# Patient Record
Sex: FEMALE | Race: WHITE | NOT HISPANIC OR LATINO | URBAN - METROPOLITAN AREA
[De-identification: names, ages, dates, MRNs, and addresses within clinical notes are randomized per-mention and may not be internally consistent; named-entity substitution may affect disease eponyms.]

---

## 2017-02-22 ENCOUNTER — OUTPATIENT (OUTPATIENT)
Dept: OUTPATIENT SERVICES | Facility: HOSPITAL | Age: 67
LOS: 1 days | Discharge: HOME | End: 2017-02-22

## 2017-02-22 DIAGNOSIS — R10.30 LOWER ABDOMINAL PAIN, UNSPECIFIED: ICD-10-CM

## 2017-02-22 DIAGNOSIS — Z12.11 ENCOUNTER FOR SCREENING FOR MALIGNANT NEOPLASM OF COLON: ICD-10-CM

## 2017-02-22 LAB
ANION GAP SERPL CALC-SCNC: 7 MMOL/L — SIGNIFICANT CHANGE UP (ref 5–17)
BASOPHILS # BLD AUTO: 0.1 K/UL — SIGNIFICANT CHANGE UP (ref 0–0.2)
BASOPHILS NFR BLD AUTO: 1.3 % — SIGNIFICANT CHANGE UP (ref 0–2)
BUN SERPL-MCNC: 8 MG/DL — SIGNIFICANT CHANGE UP (ref 7–23)
CALCIUM SERPL-MCNC: 8.9 MG/DL — SIGNIFICANT CHANGE UP (ref 8.5–10.1)
CHLORIDE SERPL-SCNC: 106 MMOL/L — SIGNIFICANT CHANGE UP (ref 96–108)
CO2 SERPL-SCNC: 30 MMOL/L — SIGNIFICANT CHANGE UP (ref 22–31)
CREAT SERPL-MCNC: 0.66 MG/DL — SIGNIFICANT CHANGE UP (ref 0.5–1.3)
EOSINOPHIL # BLD AUTO: 0.1 K/UL — SIGNIFICANT CHANGE UP (ref 0–0.5)
EOSINOPHIL NFR BLD AUTO: 2.6 % — SIGNIFICANT CHANGE UP (ref 0–6)
GLUCOSE SERPL-MCNC: 100 MG/DL — HIGH (ref 70–99)
HCT VFR BLD CALC: 42.8 % — SIGNIFICANT CHANGE UP (ref 34.5–45)
HGB BLD-MCNC: 14.2 G/DL — SIGNIFICANT CHANGE UP (ref 11.5–15.5)
LYMPHOCYTES # BLD AUTO: 1.1 K/UL — SIGNIFICANT CHANGE UP (ref 1–3.3)
LYMPHOCYTES # BLD AUTO: 29.5 % — SIGNIFICANT CHANGE UP (ref 13–44)
MCHC RBC-ENTMCNC: 29.7 PG — SIGNIFICANT CHANGE UP (ref 27–34)
MCHC RBC-ENTMCNC: 33.3 GM/DL — SIGNIFICANT CHANGE UP (ref 32–36)
MCV RBC AUTO: 89 FL — SIGNIFICANT CHANGE UP (ref 80–100)
MONOCYTES # BLD AUTO: 0.3 K/UL — SIGNIFICANT CHANGE UP (ref 0–0.9)
MONOCYTES NFR BLD AUTO: 7 % — SIGNIFICANT CHANGE UP (ref 2–14)
NEUTROPHILS # BLD AUTO: 2.3 K/UL — SIGNIFICANT CHANGE UP (ref 1.8–7.4)
NEUTROPHILS NFR BLD AUTO: 59.6 % — SIGNIFICANT CHANGE UP (ref 43–77)
PLATELET # BLD AUTO: 330 K/UL — SIGNIFICANT CHANGE UP (ref 150–400)
POTASSIUM SERPL-MCNC: 3.8 MMOL/L — SIGNIFICANT CHANGE UP (ref 3.5–5.3)
POTASSIUM SERPL-SCNC: 3.8 MMOL/L — SIGNIFICANT CHANGE UP (ref 3.5–5.3)
RBC # BLD: 4.81 M/UL — SIGNIFICANT CHANGE UP (ref 3.8–5.2)
RBC # FLD: 13 % — SIGNIFICANT CHANGE UP (ref 10.3–14.5)
SODIUM SERPL-SCNC: 143 MMOL/L — SIGNIFICANT CHANGE UP (ref 135–145)
WBC # BLD: 3.9 K/UL — SIGNIFICANT CHANGE UP (ref 3.8–10.5)
WBC # FLD AUTO: 3.9 K/UL — SIGNIFICANT CHANGE UP (ref 3.8–10.5)

## 2017-02-28 ENCOUNTER — RESULT REVIEW (OUTPATIENT)
Age: 67
End: 2017-02-28

## 2017-03-01 ENCOUNTER — OUTPATIENT (OUTPATIENT)
Dept: OUTPATIENT SERVICES | Facility: HOSPITAL | Age: 67
LOS: 1 days | Discharge: HOME | End: 2017-03-01
Payer: COMMERCIAL

## 2017-03-01 VITALS
WEIGHT: 187.83 LBS | HEART RATE: 99 BPM | OXYGEN SATURATION: 98 % | RESPIRATION RATE: 18 BRPM | SYSTOLIC BLOOD PRESSURE: 139 MMHG | HEIGHT: 65 IN | DIASTOLIC BLOOD PRESSURE: 82 MMHG | TEMPERATURE: 97 F

## 2017-03-01 DIAGNOSIS — Z98.890 OTHER SPECIFIED POSTPROCEDURAL STATES: Chronic | ICD-10-CM

## 2017-03-01 DIAGNOSIS — N83.209 UNSPECIFIED OVARIAN CYST, UNSPECIFIED SIDE: Chronic | ICD-10-CM

## 2017-03-01 LAB
C DIFF BY PCR RESULT: DETECTED
C DIFF TOX GENS STL QL NAA+PROBE: SIGNIFICANT CHANGE UP

## 2017-03-01 PROCEDURE — 88305 TISSUE EXAM BY PATHOLOGIST: CPT | Mod: 26

## 2017-03-01 RX ORDER — SODIUM CHLORIDE 9 MG/ML
1000 INJECTION INTRAMUSCULAR; INTRAVENOUS; SUBCUTANEOUS
Qty: 0 | Refills: 0 | Status: DISCONTINUED | OUTPATIENT
Start: 2017-03-01 | End: 2017-03-16

## 2017-03-01 NOTE — ASU PATIENT PROFILE, ADULT - PMH
Hyperlipemia    Hypertension    Migraine    PNA (pneumonia)    RAD (reactive airway disease)    Vertigo

## 2017-03-03 LAB — SURGICAL PATHOLOGY FINAL REPORT - CH: SIGNIFICANT CHANGE UP

## 2017-03-08 DIAGNOSIS — I34.1 NONRHEUMATIC MITRAL (VALVE) PROLAPSE: ICD-10-CM

## 2017-03-08 DIAGNOSIS — G47.33 OBSTRUCTIVE SLEEP APNEA (ADULT) (PEDIATRIC): ICD-10-CM

## 2017-03-08 DIAGNOSIS — J45.909 UNSPECIFIED ASTHMA, UNCOMPLICATED: ICD-10-CM

## 2017-03-08 DIAGNOSIS — K57.30 DIVERTICULOSIS OF LARGE INTESTINE WITHOUT PERFORATION OR ABSCESS WITHOUT BLEEDING: ICD-10-CM

## 2017-03-08 DIAGNOSIS — E78.5 HYPERLIPIDEMIA, UNSPECIFIED: ICD-10-CM

## 2017-03-08 DIAGNOSIS — G43.909 MIGRAINE, UNSPECIFIED, NOT INTRACTABLE, WITHOUT STATUS MIGRAINOSUS: ICD-10-CM

## 2017-03-08 DIAGNOSIS — K52.9 NONINFECTIVE GASTROENTERITIS AND COLITIS, UNSPECIFIED: ICD-10-CM

## 2017-03-08 DIAGNOSIS — F32.9 MAJOR DEPRESSIVE DISORDER, SINGLE EPISODE, UNSPECIFIED: ICD-10-CM

## 2017-03-08 DIAGNOSIS — I10 ESSENTIAL (PRIMARY) HYPERTENSION: ICD-10-CM

## 2017-03-08 DIAGNOSIS — K64.8 OTHER HEMORRHOIDS: ICD-10-CM

## 2017-03-08 DIAGNOSIS — R10.32 LEFT LOWER QUADRANT PAIN: ICD-10-CM

## 2017-03-08 DIAGNOSIS — K21.9 GASTRO-ESOPHAGEAL REFLUX DISEASE WITHOUT ESOPHAGITIS: ICD-10-CM

## 2018-05-16 NOTE — ASU PATIENT PROFILE, ADULT - PMH
Carotid stenosis    Chronic sinusitis    Depression    Hyperlipemia    Hypertension    IBS (irritable bowel syndrome)    Migraine    PNA (pneumonia)    RAD (reactive airway disease)    Sciatica  b/l  Tendonitis  elbow, wrist  Vertigo

## 2018-05-17 ENCOUNTER — OUTPATIENT (OUTPATIENT)
Dept: OUTPATIENT SERVICES | Facility: HOSPITAL | Age: 68
LOS: 1 days | Discharge: HOME | End: 2018-05-17
Payer: MEDICARE

## 2018-05-17 ENCOUNTER — RESULT REVIEW (OUTPATIENT)
Age: 68
End: 2018-05-17

## 2018-05-17 VITALS
WEIGHT: 188.94 LBS | TEMPERATURE: 97 F | DIASTOLIC BLOOD PRESSURE: 66 MMHG | OXYGEN SATURATION: 98 % | HEART RATE: 84 BPM | RESPIRATION RATE: 14 BRPM | SYSTOLIC BLOOD PRESSURE: 117 MMHG | HEIGHT: 65 IN

## 2018-05-17 DIAGNOSIS — Z98.890 OTHER SPECIFIED POSTPROCEDURAL STATES: Chronic | ICD-10-CM

## 2018-05-17 DIAGNOSIS — N83.209 UNSPECIFIED OVARIAN CYST, UNSPECIFIED SIDE: Chronic | ICD-10-CM

## 2018-05-17 PROCEDURE — 88305 TISSUE EXAM BY PATHOLOGIST: CPT | Mod: 26

## 2018-05-17 RX ORDER — ROSUVASTATIN CALCIUM 5 MG/1
1 TABLET ORAL
Qty: 0 | Refills: 0 | COMMUNITY

## 2018-05-17 RX ORDER — ELETRIPTAN HYDROBROMIDE 20 MG/1
1 TABLET, FILM COATED ORAL
Qty: 0 | Refills: 0 | COMMUNITY

## 2018-05-17 RX ORDER — AZELASTINE 137 UG/1
2 SPRAY, METERED NASAL
Qty: 0 | Refills: 0 | COMMUNITY

## 2018-05-17 RX ORDER — CHOLECALCIFEROL (VITAMIN D3) 125 MCG
1 CAPSULE ORAL
Qty: 0 | Refills: 0 | COMMUNITY

## 2018-05-17 RX ORDER — EZETIMIBE 10 MG/1
1 TABLET ORAL
Qty: 0 | Refills: 0 | COMMUNITY

## 2018-05-21 LAB — SURGICAL PATHOLOGY FINAL REPORT - CH: SIGNIFICANT CHANGE UP

## 2018-05-23 DIAGNOSIS — F32.9 MAJOR DEPRESSIVE DISORDER, SINGLE EPISODE, UNSPECIFIED: ICD-10-CM

## 2018-05-23 DIAGNOSIS — K52.9 NONINFECTIVE GASTROENTERITIS AND COLITIS, UNSPECIFIED: ICD-10-CM

## 2018-05-23 DIAGNOSIS — E78.00 PURE HYPERCHOLESTEROLEMIA, UNSPECIFIED: ICD-10-CM

## 2018-05-23 DIAGNOSIS — I10 ESSENTIAL (PRIMARY) HYPERTENSION: ICD-10-CM

## 2018-05-23 DIAGNOSIS — K57.30 DIVERTICULOSIS OF LARGE INTESTINE WITHOUT PERFORATION OR ABSCESS WITHOUT BLEEDING: ICD-10-CM

## 2018-05-23 DIAGNOSIS — I34.1 NONRHEUMATIC MITRAL (VALVE) PROLAPSE: ICD-10-CM

## 2018-05-23 DIAGNOSIS — E78.5 HYPERLIPIDEMIA, UNSPECIFIED: ICD-10-CM

## 2018-05-23 DIAGNOSIS — K62.89 OTHER SPECIFIED DISEASES OF ANUS AND RECTUM: ICD-10-CM

## 2018-07-31 ENCOUNTER — APPOINTMENT (OUTPATIENT)
Dept: COLORECTAL SURGERY | Facility: CLINIC | Age: 68
End: 2018-07-31
Payer: MEDICARE

## 2018-07-31 VITALS
SYSTOLIC BLOOD PRESSURE: 120 MMHG | WEIGHT: 178 LBS | DIASTOLIC BLOOD PRESSURE: 82 MMHG | RESPIRATION RATE: 14 BRPM | HEIGHT: 65 IN | BODY MASS INDEX: 29.66 KG/M2 | TEMPERATURE: 98.2 F

## 2018-07-31 DIAGNOSIS — K64.5 PERIANAL VENOUS THROMBOSIS: ICD-10-CM

## 2018-07-31 PROBLEM — J18.9 PNEUMONIA, UNSPECIFIED ORGANISM: Chronic | Status: ACTIVE | Noted: 2017-03-01

## 2018-07-31 PROBLEM — M54.30 SCIATICA, UNSPECIFIED SIDE: Chronic | Status: ACTIVE | Noted: 2018-05-10

## 2018-07-31 PROBLEM — J45.909 UNSPECIFIED ASTHMA, UNCOMPLICATED: Chronic | Status: ACTIVE | Noted: 2017-03-01

## 2018-07-31 PROBLEM — I10 ESSENTIAL (PRIMARY) HYPERTENSION: Chronic | Status: ACTIVE | Noted: 2017-03-01

## 2018-07-31 PROBLEM — J32.9 CHRONIC SINUSITIS, UNSPECIFIED: Chronic | Status: ACTIVE | Noted: 2018-05-10

## 2018-07-31 PROBLEM — M77.9 ENTHESOPATHY, UNSPECIFIED: Chronic | Status: ACTIVE | Noted: 2018-05-10

## 2018-07-31 PROBLEM — R42 DIZZINESS AND GIDDINESS: Chronic | Status: ACTIVE | Noted: 2017-03-01

## 2018-07-31 PROBLEM — K58.9 IRRITABLE BOWEL SYNDROME WITHOUT DIARRHEA: Chronic | Status: ACTIVE | Noted: 2018-05-10

## 2018-07-31 PROBLEM — G43.909 MIGRAINE, UNSPECIFIED, NOT INTRACTABLE, WITHOUT STATUS MIGRAINOSUS: Chronic | Status: ACTIVE | Noted: 2017-03-01

## 2018-07-31 PROBLEM — I65.29 OCCLUSION AND STENOSIS OF UNSPECIFIED CAROTID ARTERY: Chronic | Status: ACTIVE | Noted: 2018-05-10

## 2018-07-31 PROBLEM — E78.5 HYPERLIPIDEMIA, UNSPECIFIED: Chronic | Status: ACTIVE | Noted: 2017-03-01

## 2018-07-31 PROBLEM — F32.9 MAJOR DEPRESSIVE DISORDER, SINGLE EPISODE, UNSPECIFIED: Chronic | Status: ACTIVE | Noted: 2018-05-10

## 2018-07-31 PROCEDURE — 99243 OFF/OP CNSLTJ NEW/EST LOW 30: CPT | Mod: 25

## 2018-07-31 PROCEDURE — 46320 REMOVAL OF HEMORRHOID CLOT: CPT

## 2018-07-31 PROCEDURE — 99203 OFFICE O/P NEW LOW 30 MIN: CPT | Mod: 25

## 2021-04-18 ENCOUNTER — APPOINTMENT (OUTPATIENT)
Dept: DISASTER EMERGENCY | Facility: CLINIC | Age: 71
End: 2021-04-18

## 2021-04-18 DIAGNOSIS — Z01.818 ENCOUNTER FOR OTHER PREPROCEDURAL EXAMINATION: ICD-10-CM

## 2021-04-18 LAB — SARS-COV-2 N GENE NPH QL NAA+PROBE: NOT DETECTED

## 2021-12-27 ENCOUNTER — TRANSCRIPTION ENCOUNTER (OUTPATIENT)
Age: 71
End: 2021-12-27

## 2022-06-21 ENCOUNTER — APPOINTMENT (OUTPATIENT)
Dept: PAIN MANAGEMENT | Facility: CLINIC | Age: 72
End: 2022-06-21

## 2022-07-18 ENCOUNTER — APPOINTMENT (OUTPATIENT)
Dept: PAIN MANAGEMENT | Facility: CLINIC | Age: 72
End: 2022-07-18

## 2022-07-18 PROCEDURE — 27096 INJECT SACROILIAC JOINT: CPT | Mod: LT

## 2022-07-18 NOTE — PROCEDURE
[FreeTextEntry3] : Date of Service: 07/18/2022 \par \par Account: [id number]\par \par Patient: TORI IRBY \par \par YOB: 1950\par \par Age: 72 year\par \par \par Surgeon:  Naila Middleton M.D.\par \par Pre-Operative Diagnosis: \par \par Post Operative Diagnosis: \par \par Procedure: Left Sacroiliac joint injection under fluoroscopic guidance\par \par                     Left Sacroiliac joint arthrogram  \par \par Anesthesia:  MAC\par \par \par This procedure was carried out using fluoroscopic guidance.  The risks and benefits of the procedure were discussed extensively with the patient.  The consent of the patient was obtained and the following procedure was performed.\par \par The patient was placed in the prone position.  The patient's back was prepped and draped in a sterile fashion.  The fluoroscopic image intensifier was then positioned so that anterior and posterior portion of the sacroiliac joint lined up in the same plane and appeared on the image as clear space.  This was achieved with slight cephalad and right medial angulation.  The area corresponding to the left inferior SIJ space was then identified and marked. \par \par The skin and subcutaneous structures were then anesthetized using 1 cc of 1% lidocaine.  A 22 gauge spinal needle was then inserted and directed into the right sacroiliac joint space using fluoroscopic guidance.  After negative aspiration for heme and CSF, 2 cc of Omnipaque was injected and appeared to fill the joint space.  An injectate of 3cc 0.25% marcaine plus 80 mg of Kenalog was then injected into the left sacroiliac joint space.\par \par The needles were then removed and pressure was applied.  Anesthesia personnel were present throughout the procedure.\par \par The patient was instructed to apply ice over the injection site for twenty minutes every two hours for the next 24 to 48 hours.  The patient was also instructed to contact me immediately if there were any problems. \par \par Naila Middleton M.D.\par

## 2022-08-01 ENCOUNTER — TRANSCRIPTION ENCOUNTER (OUTPATIENT)
Age: 72
End: 2022-08-01

## 2022-08-08 ENCOUNTER — APPOINTMENT (OUTPATIENT)
Dept: PAIN MANAGEMENT | Facility: CLINIC | Age: 72
End: 2022-08-08

## 2022-08-08 VITALS — HEIGHT: 65 IN | BODY MASS INDEX: 29.99 KG/M2 | WEIGHT: 180 LBS

## 2022-08-08 DIAGNOSIS — M46.1 SACROILIITIS, NOT ELSEWHERE CLASSIFIED: ICD-10-CM

## 2022-08-08 PROCEDURE — 99214 OFFICE O/P EST MOD 30 MIN: CPT

## 2022-08-08 NOTE — ASSESSMENT
[FreeTextEntry1] : After discussing various treatment options with the patient including but not limited to oral medications, physical therapy, exercise, modalities as well as interventional spinal injections, we have decided with the following plan:\par I personally reviewed the MRI/CT scan images and agree with the radiologist's report. The radiological findings were discussed with the patient.\par The risks, benefits, contents and alternatives to injection were explained in full to the patient. Risks outlined include but are not limited to infection,sepsis, bleeding, post-dural puncture headache, nerve damage, temporary increase in pain, syncopal episode, failure to resolve symptoms, allergic reaction, symptom recurrence, and elevation of blood sugar in diabetics. Cortisone may cause immunosuppression. Patient understands the risks. All questions were answered. After discussion of options, patient requested an injection. Information regarding the injection was given to the patient. Which medications to stop prior to the injection was explained to the patient as well.\par Follow up in 1-2 weeks post injection for re-evaluation. \par Continue Home exercises, stretching, activity modification, physical therapy, and conservative care.\par Patient is presenting with acute/sub-acute radicular pain with impairment in ADLs and functionality. The pain has not responded to conservative care including nsaid therapy and/or physical therapy. There is no bleeding tendency, unstable medical condition, or systemic infection.\par \par Patient is presenting with acute/sub-acute radicular pain with impairment in ADLs and functionality.  The pain has not responded to  conservative care including nsaid therapy and/or physical therapy.  There is no bleeding tendency, unstable medical condition, or systemic infection.\par \par LESI L5-S1 Will call

## 2022-08-08 NOTE — HISTORY OF PRESENT ILLNESS
[Mid-back] : mid-back [Lower back] : lower back [4] : 4 [1] : 2 [Dull/Aching] : dull/aching [] : yes [Intermittent] : intermittent [Meds] : meds [Sitting] : sitting [Standing] : standing [Walking] : walking [FreeTextEntry1] : 08/08/2022: follow up today after left SIJ injection on 7/18/22. \par \par 1/24/22: follow up today after left L4/5 L5/S1 TFESI on 1/4/22. Pain in the left lower back with radiation into the left\par buttock. pain over the left SIJ. \par \par 12/6/21: follow up today. Pain is returning will schedule repeat injection.\par \par 7/28/21- patient had 60% relief from pain in leg. Still has some upper thigh pain. Will repeat injection. ESR was normal. very tender over the left piriformis muscle. \par \par 6/23/21: follow up today to review MRI. This was essentially unchanged from previous. Pain 5/10 today. Pain is\par currently in the left lower back with radiation to the left thigh. do not have the results of the blood work. \par \par MRI 6/15/211: At L5-S1: There is disc bulge and facet arthropathy. There is bilateral subarticular recess stenosis with impingement of descending S1 nerve roots. There is left greater than right neuroforaminal stenosis with impingement of exiting left L5 nerve root. Findings are unchanged from previous study.\par At L4-5: There is disc bulge and facet arthropathy. There is mild to moderate spinal canal stenosis. There is left greater than right subarticular recess stenosis with suspected impingement descending L5 nerve roots. There is moderate left and mild right neuroforaminal stenosis. Findings are not significant changed from previous examination.\par At L3-4: There is disc bulge and right foraminal disc protrusion. There is mild to moderate spinal canal stenosis. There is moderate right and mild left neuroforaminal stenosis mild impingement of exiting nerve root on the right.\par Findings are not significant changed from previous examination.\par At L2-3: There is disc bulge with mild spinal canal bilateral neural foraminal stenosis. Findings are not significant changed from previous examination.\par At L1-2: There is disc bulge and shallow right central disc protrusion without significant spinal canal stenosis. There is mild right subarticular recess stenosis and mild right neuroforaminal stenosis unchanged from previous\par examination.\par \par 6/14/21- Patient here for f/u. patient states about 1 month ago she was laying flat in bed and could not lift left leg.\par She has to lift her leg with her hands. Also complains of pain is in front of thigh to the knee. Has slight numbness. Will get new MRI as it is over 3 years old.\par \par 2/23/21- Patient complains of b/l low back pain worse on left. Would like to call for an injection as she will be getting her second covid vaccine next week [FreeTextEntry7] : Left buttock

## 2022-11-08 RX ORDER — LIDOCAINE 5 G/100G
5 OINTMENT TOPICAL
Qty: 1 | Refills: 2 | Status: ACTIVE | COMMUNITY
Start: 2022-08-08 | End: 1900-01-01

## 2022-11-30 ENCOUNTER — RX RENEWAL (OUTPATIENT)
Age: 72
End: 2022-11-30

## 2022-11-30 RX ORDER — DICLOFENAC SODIUM 75 MG/1
75 TABLET, DELAYED RELEASE ORAL
Qty: 30 | Refills: 3 | Status: ACTIVE | COMMUNITY
Start: 2022-08-08 | End: 1900-01-01

## 2022-12-02 RX ORDER — LIDOCAINE 5% 700 MG/1
5 PATCH TOPICAL
Qty: 90 | Refills: 2 | Status: ACTIVE | COMMUNITY
Start: 2022-12-02 | End: 1900-01-01

## 2023-06-15 ENCOUNTER — APPOINTMENT (OUTPATIENT)
Dept: PAIN MANAGEMENT | Facility: CLINIC | Age: 73
End: 2023-06-15
Payer: MEDICARE

## 2023-06-15 VITALS — HEIGHT: 65 IN | WEIGHT: 182 LBS | BODY MASS INDEX: 30.32 KG/M2

## 2023-06-15 PROCEDURE — 99214 OFFICE O/P EST MOD 30 MIN: CPT

## 2023-06-15 NOTE — HISTORY OF PRESENT ILLNESS
[Mid-back] : mid-back [Lower back] : lower back [4] : 4 [1] : 2 [Dull/Aching] : dull/aching [] : yes [Intermittent] : intermittent [Meds] : meds [Sitting] : sitting [Standing] : standing [Walking] : walking [Buttock] : buttock [Left Leg] : left leg [Radiating] : radiating [Leisure] : leisure [Tightness] : tightness [Sleep] : sleep [Social interactions] : social interactions [Bending forward] : bending forward [Lying in bed] : lying in bed [FreeTextEntry1] : 06/15/2023: follow up today. Pain is across the lower back to the hips and intermittent to the left posterior thigh. Last month she had an intense pain that limited her walking. Pain took about 2 days to dissipate. \par \par 08/08/2022: follow up today after left SIJ injection on 7/18/22. \par \par 1/24/22: follow up today after left L4/5 L5/S1 TFESI on 1/4/22. Pain in the left lower back with radiation into the left\par buttock. pain over the left SIJ. \par \par 12/6/21: follow up today. Pain is returning will schedule repeat injection.\par \par 7/28/21- patient had 60% relief from pain in leg. Still has some upper thigh pain. Will repeat injection. ESR was normal. very tender over the left piriformis muscle. \par \par 6/23/21: follow up today to review MRI. This was essentially unchanged from previous. Pain 5/10 today. Pain is\par currently in the left lower back with radiation to the left thigh. do not have the results of the blood work. \par \par MRI 6/15/21: At L5-S1: There is disc bulge and facet arthropathy. There is bilateral subarticular recess stenosis with impingement of descending S1 nerve roots. There is left greater than right neuroforaminal stenosis with impingement of exiting left L5 nerve root. Findings are unchanged from previous study.\par At L4-5: There is disc bulge and facet arthropathy. There is mild to moderate spinal canal stenosis. There is left greater than right subarticular recess stenosis with suspected impingement descending L5 nerve roots. There is moderate left and mild right neuroforaminal stenosis. Findings are not significant changed from previous examination.\par At L3-4: There is disc bulge and right foraminal disc protrusion. There is mild to moderate spinal canal stenosis. There is moderate right and mild left neuroforaminal stenosis mild impingement of exiting nerve root on the right.\par Findings are not significant changed from previous examination.\par At L2-3: There is disc bulge with mild spinal canal bilateral neural foraminal stenosis. Findings are not significant changed from previous examination.\par At L1-2: There is disc bulge and shallow right central disc protrusion without significant spinal canal stenosis. There is mild right subarticular recess stenosis and mild right neuroforaminal stenosis unchanged from previous\par examination.\par \par 6/14/21- Patient here for f/u. patient states about 1 month ago she was laying flat in bed and could not lift left leg.\par She has to lift her leg with her hands. Also complains of pain is in front of thigh to the knee. Has slight numbness. Will get new MRI as it is over 3 years old.\par \par 2/23/21- Patient complains of b/l low back pain worse on left. Would like to call for an injection as she will be getting her second covid vaccine next week [FreeTextEntry6] : weakness [FreeTextEntry7] : Left buttock

## 2023-06-15 NOTE — PHYSICAL EXAM
[] : SI joint tenderness [TWNoteComboBox7] : forward flexion 60 degrees [de-identified] : extension 20 degrees

## 2023-06-15 NOTE — ASSESSMENT
[FreeTextEntry1] : After discussing various treatment options with the patient including but not limited to oral medications, physical therapy, exercise, modalities as well as interventional spinal injections, we have decided with the following plan:\par \par 1) Intervention Injection Therapy:\par I personally reviewed the MRI/CT scan images and agree with the radiologist's report. The radiological findings were discussed with the patient.\par The risks, benefits, contents and alternatives to injection were explained in full to the patient. Risks outlined include but are not limited to infection,sepsis, bleeding, post-dural puncture headache, nerve damage, temporary increase in pain, syncopal episode, failure to resolve symptoms, allergic reaction, symptom recurrence, and elevation of blood sugar in diabetics. Cortisone may cause immunosuppression. Patient understands the risks. All questions were answered. After discussion of options, patient requested an injection. Information regarding the injection was given to the patient. Which medications to stop prior to the injection was explained to the patient as well.\par \par Follow up in 1-2 weeks post injection for re-evaluation. \par Continue Home exercises, stretching, activity modification, physical therapy, and conservative care.\par \par Patient is presenting with acute/sub-acute radicular pain with impairment in ADLs and functionality.  The pain has not responded sufficiently to  conservative care including nsaid therapy and/or physical therapy.  There is no bleeding tendency, unstable medical condition, or systemic infection. The purpose of the spinal injections is to facilitate active therapy by providing short term relief through reduction of pain and inflammation. \par \par Injections, by themselves, are not likely to provide long-term relief. Rather, active rehabilitation with modified work achieves long-term relief by increasing active ROM, strength and stability. \par \par left L5/s1 and S1 TFESI

## 2023-06-27 ENCOUNTER — APPOINTMENT (OUTPATIENT)
Dept: PAIN MANAGEMENT | Facility: CLINIC | Age: 73
End: 2023-06-27
Payer: MEDICARE

## 2023-06-27 PROCEDURE — 64484 NJX AA&/STRD TFRM EPI L/S EA: CPT | Mod: LT

## 2023-06-27 PROCEDURE — 64483 NJX AA&/STRD TFRM EPI L/S 1: CPT | Mod: LT

## 2023-06-27 NOTE — PROCEDURE
[FreeTextEntry3] : Date of Service: 06/27/2023 \par \par Account: [id number]\par \par Patient: TORI IRBY \par \par YOB: 1950\par \par Age: 73 year\par \par \par Surgeon: Naila Middleton M.D.\par \par Assistant: None.\par \par Pre-Operative Diagnosis: Lumbosacral Radiculitis (M54.17)\par \par Post Operative Diagnosis: Lumbosacral Radiculitis (M54.17)\par \par Procedure: Left L5-S1, S1 transforaminal epidural steroid injection under fluoroscopic guidance.  \par \par Anesthesia:      MAC\par \par \par This procedure was carried out using fluoroscopic guidance.  The risks and benefits of the procedure were discussed extensively with the patient.  The consent of the patient was obtained and the following procedure was performed. The patient was placed in the prone position on the fluoroscopic table and the lumbar area was prepped and draped in a sterile fashion.\par \par The left L5-S1 neural foramen was then identified on left oblique  "liliya dog" anatomical view at the 6 o' clock position using fluoroscopic guidance, and the area was marked. The overlying skin and subcutaneous structures were anesthetized using sterile technique with 1% Lidocaine.  A 22 gauge spinal needle was directed toward the inferior (6 o'clock) position of the pedicle, which formed the roof of the identified foramen.  Once in the epidural space, after negative aspiration for heme and CSF, 1cc of Omnipaque contrast was injected to confirm epidural location and assess filling defects and rule out intravascular needle placement. \par \par The following contrast flow and epidurogram was observed: no intravascular or intrathecal flow pattern was noted.  No blood or CSF was aspirated. Omnipaque spread appeared to outline the left L5 nerve root and spread medially into the epidural space.  \par \par After this, an injectate of 3 cc preservative free normal saline plus 40 mg of Kenalog was injected in the epidural space.\par \par The  left S1 neural foramen was then identified on left oblique anatomical view at the 6 o' clock position of the S1 pedicle using fluoroscopic guidance, and the area was marked. The overlying skin and subcutaneous structures were anesthetized using sterile technique with 1% Lidocaine.  A 22 gauge spinal needle was directed toward the inferior (6 o'clock) position of the pedicle, which formed the roof of the identified foramen.  Once in the epidural space, after negative aspiration for heme and CSF, 1cc of Omnipaque contrast was injected to confirm epidural location and assess filling defects and rule out intravascular needle placement. \par \par The following contrast flow and epidurogram was observed: no intravascular or intrathecal flow pattern was noted.  No blood or CSF was aspirated. Omnipaque spread appeared to outline the left S1 nerve root and spread medially into the epidural space.  \par \par After this, an injectate of 3 cc preservative free normal saline plus 40 mg of Kenalog was injected in the epidural space.\par \par The needle was subsequently removed.  Vital signs remained normal.  Pulse oximeter was used throughout the procedure and the patient's pulse and oxygen saturation remained within normal limits.  The patient tolerated the procedure well.  There were no complications.  The patient was instructed to apply ice over the injection sites for twenty minutes every two hours for the next 24 to 48 hours.  The patient was also instructed to contact me immediately if there were any problems.\par \par Naila Middleton M.D.

## 2023-07-27 ENCOUNTER — APPOINTMENT (OUTPATIENT)
Dept: PAIN MANAGEMENT | Facility: CLINIC | Age: 73
End: 2023-07-27
Payer: MEDICARE

## 2023-07-27 VITALS — BODY MASS INDEX: 30.99 KG/M2 | WEIGHT: 186 LBS | HEIGHT: 65 IN

## 2023-07-27 PROCEDURE — 99214 OFFICE O/P EST MOD 30 MIN: CPT

## 2023-07-27 NOTE — PHYSICAL EXAM
[] : no ecchymosis [TWNoteComboBox7] : forward flexion 60 degrees [de-identified] : extension 20 degrees

## 2023-07-27 NOTE — HISTORY OF PRESENT ILLNESS
[Mid-back] : mid-back [Lower back] : lower back [Buttock] : buttock [Left Leg] : left leg [Right Leg] : right leg [5] : 5 [1] : 2 [Dull/Aching] : dull/aching [Radiating] : radiating [Tightness] : tightness [] : yes [Intermittent] : intermittent [Leisure] : leisure [Sleep] : sleep [Social interactions] : social interactions [Meds] : meds [Sitting] : sitting [Standing] : standing [Walking] : walking [Bending forward] : bending forward [Lying in bed] : lying in bed [FreeTextEntry1] : 07/27/2023: follow up today for Left L5-S1, S1 TFESI on 6/27,  Had 50% relief.  Pain still in left buttocks\par \par 06/15/2023: follow up today. Pain is across the lower back to the hips and intermittent to the left posterior thigh. Last month she had an intense pain that limited her walking. Pain took about 2 days to dissipate. \par \par 08/08/2022: follow up today after left SIJ injection on 7/18/22. \par \par 1/24/22: follow up today after left L4/5 L5/S1 TFESI on 1/4/22. Pain in the left lower back with radiation into the left\par buttock. pain over the left SIJ. \par \par 12/6/21: follow up today. Pain is returning will schedule repeat injection.\par \par 7/28/21- patient had 60% relief from pain in leg. Still has some upper thigh pain. Will repeat injection. ESR was normal. very tender over the left piriformis muscle. \par \par 6/23/21: follow up today to review MRI. This was essentially unchanged from previous. Pain 5/10 today. Pain is\par currently in the left lower back with radiation to the left thigh. do not have the results of the blood work. \par \par MRI 6/15/21: At L5-S1: There is disc bulge and facet arthropathy. There is bilateral subarticular recess stenosis with impingement of descending S1 nerve roots. There is left greater than right neuroforaminal stenosis with impingement of exiting left L5 nerve root. Findings are unchanged from previous study.\par At L4-5: There is disc bulge and facet arthropathy. There is mild to moderate spinal canal stenosis. There is left greater than right subarticular recess stenosis with suspected impingement descending L5 nerve roots. There is moderate left and mild right neuroforaminal stenosis. Findings are not significant changed from previous examination.\par At L3-4: There is disc bulge and right foraminal disc protrusion. There is mild to moderate spinal canal stenosis. There is moderate right and mild left neuroforaminal stenosis mild impingement of exiting nerve root on the right.\par Findings are not significant changed from previous examination.\par At L2-3: There is disc bulge with mild spinal canal bilateral neural foraminal stenosis. Findings are not significant changed from previous examination.\par At L1-2: There is disc bulge and shallow right central disc protrusion without significant spinal canal stenosis. There is mild right subarticular recess stenosis and mild right neuroforaminal stenosis unchanged from previous\par examination.\par \par 6/14/21- Patient here for f/u. patient states about 1 month ago she was laying flat in bed and could not lift left leg.\par She has to lift her leg with her hands. Also complains of pain is in front of thigh to the knee. Has slight numbness. Will get new MRI as it is over 3 years old.\par \par 2/23/21- Patient complains of b/l low back pain worse on left. Would like to call for an injection as she will be getting her second covid vaccine next week [FreeTextEntry6] : weakness [FreeTextEntry7] : Left buttock

## 2023-07-27 NOTE — PHYSICAL EXAM
[] : no ecchymosis [TWNoteComboBox7] : forward flexion 60 degrees [de-identified] : extension 20 degrees

## 2023-08-30 ENCOUNTER — APPOINTMENT (OUTPATIENT)
Dept: PAIN MANAGEMENT | Facility: CLINIC | Age: 73
End: 2023-08-30
Payer: MEDICARE

## 2023-08-30 PROCEDURE — J3490M: CUSTOM | Mod: NC

## 2023-08-30 PROCEDURE — 64445 NJX AA&/STRD SCIATIC NRV IMG: CPT | Mod: LT

## 2023-08-30 NOTE — PROCEDURE
[FreeTextEntry3] : Date of Service: 08/30/2023   Account: [id number]  Patient: TORI IRBY   YOB: 1950  Age: 73 year   LEFT SCIATIC NERVE BLOCK/PIRIFROMIS INJECTION  Pre-op diagnosis:   Post Operative Diagnosis:    Procedure:  Left Sciatic nerve Block/Piriformis injection under flouroscopic guidance.  Anesthesia: MAC   This procedure was carried out using fluoroscopic guidance.  The risks and benefits of the procedure were discussed extensively with the patient.  The consent of the patient was obtained and the following procedure was performed.  The patient was placed prone on the table.  Fluoroscopy was utilized to delineate the anatomy of the sacrum and the right greater trochanter.  Surface and deep landmarks were identified as well.  After skin prep, drape, and local infiltration of 1.5% lidocaine, a 22 gauge spinal needle was advanced approximately half way between the sacrum and the greater trochanter.  The needle was passed to a depth of maximal tenderness or until oss was contacted.  1 cc of Omnipaque was injected and lateral spread delineating the piriformis muscle was oberserved.  No vascular spread was noted.  40mg of Kenalog with 3 cc of 0.25% marcaine was then injected.  The patient tolerated the procedure well.  Vital signs remained normal throughout the procedure.  The patient was instructed to apply ice over the injection site for twenty minutes every two hours for the next 24 to 48 hours.  The patient was also instructed to contact me immediately if there were any problems  Naila Middleton M.D.

## 2023-09-28 ENCOUNTER — APPOINTMENT (OUTPATIENT)
Dept: PAIN MANAGEMENT | Facility: CLINIC | Age: 73
End: 2023-09-28
Payer: MEDICARE

## 2023-09-28 VITALS — HEIGHT: 65 IN | BODY MASS INDEX: 29.49 KG/M2 | WEIGHT: 177 LBS

## 2023-09-28 DIAGNOSIS — M54.16 RADICULOPATHY, LUMBAR REGION: ICD-10-CM

## 2023-09-28 PROCEDURE — 20552 NJX 1/MLT TRIGGER POINT 1/2: CPT

## 2023-09-28 PROCEDURE — J3490M: CUSTOM | Mod: NC

## 2023-09-28 PROCEDURE — 99214 OFFICE O/P EST MOD 30 MIN: CPT | Mod: 25

## 2024-06-13 ENCOUNTER — APPOINTMENT (OUTPATIENT)
Dept: PAIN MANAGEMENT | Facility: CLINIC | Age: 74
End: 2024-06-13
Payer: MEDICARE

## 2024-06-13 VITALS — WEIGHT: 175 LBS | BODY MASS INDEX: 29.16 KG/M2 | HEIGHT: 65 IN

## 2024-06-13 DIAGNOSIS — M25.552 PAIN IN LEFT HIP: ICD-10-CM

## 2024-06-13 PROCEDURE — 99214 OFFICE O/P EST MOD 30 MIN: CPT | Mod: 25

## 2024-06-13 PROCEDURE — 73502 X-RAY EXAM HIP UNI 2-3 VIEWS: CPT

## 2024-06-13 PROCEDURE — 20610 DRAIN/INJ JOINT/BURSA W/O US: CPT | Mod: LT

## 2024-06-13 PROCEDURE — J3490M: CUSTOM | Mod: NC

## 2024-06-13 NOTE — PROCEDURE
[Left] : of the left [Large Joint Injection] : Large joint injection [Greater Trochanteric Bursa] : greater trochanteric bursa [X-ray evidence of Osteoarthritis on this or prior visit] : x-ray evidence of Osteoarthritis on this or prior visit [Alcohol] : alcohol [Betadine] : betadine [___ cc    0.25%] : Bupivacaine (Marcaine) ~Vcc of 0.25%  [___ cc    40mg] : Triamcinolone (Kenalog) ~Vcc of 40 mg  [] : Patient tolerated procedure well [Call if redness, pain or fever occur] : call if redness, pain or fever occur [Apply ice for 15min out of every hour for the next 12-24 hours as tolerated] : apply ice for 15 minutes out of every hour for the next 12-24 hours as tolerated

## 2024-06-13 NOTE — ASSESSMENT
[FreeTextEntry1] : After discussing various treatment options with the patient including but not limited to oral medications, physical therapy, exercise, modalities as well as interventional spinal injections, we have decided with the following plan:  1) Intervention Injection Therapy: I personally reviewed the MRI/CT scan images and agree with the radiologist's report. The radiological findings were discussed with the patient. The risks, benefits, contents and alternatives to injection were explained in full to the patient. Risks outlined include but are not limited to infection,sepsis, bleeding, post-dural puncture headache, nerve damage, temporary increase in pain, syncopal episode, failure to resolve symptoms, allergic reaction, symptom recurrence, and elevation of blood sugar in diabetics. Cortisone may cause immunosuppression. Patient understands the risks. All questions were answered. After discussion of options, patient requested an injection. Information regarding the injection was given to the patient. Which medications to stop prior to the injection was explained to the patient as well.  Follow up in 1-2 weeks post injection for re-evaluation.  Continue Home exercises, stretching, activity modification, physical therapy, and conservative care.  Patient is presenting with acute/sub-acute radicular pain with impairment in ADLs and functionality.  The pain has not responded sufficiently to  conservative care including nsaid therapy and/or physical therapy.  There is no bleeding tendency, unstable medical condition, or systemic infection. The purpose of the spinal injections is to facilitate active therapy by providing short term relief through reduction of pain and inflammation.   Injections, by themselves, are not likely to provide long-term relief. Rather, active rehabilitation with modified work achieves long-term relief by increasing active ROM, strength and stability.   left L3/4 L4/5 TFESI

## 2024-06-13 NOTE — HISTORY OF PRESENT ILLNESS
[Mid-back] : mid-back [Lower back] : lower back [Buttock] : buttock [Left Leg] : left leg [Right Leg] : right leg [Dull/Aching] : dull/aching [Radiating] : radiating [Intermittent] : intermittent [Leisure] : leisure [Meds] : meds [Injection therapy] : injection therapy [Sitting] : sitting [Standing] : standing [Walking] : walking [Bending forward] : bending forward [7] : 7 [2] : 2 [Sharp] : sharp [Stairs] : stairs [Lying in bed] : lying in bed [Retired] : Work status: retired [FreeTextEntry1] : 06/13/2024: follow up today  9/28/23- fu for left piriformis injection on 8/30 75% relief.  Has low back pain, will give TPI  07/27/2023: follow up today for Left L5-S1, S1 TFESI on 6/27,  Had 50% relief.  Pain still in left buttocks  06/15/2023: follow up today. Pain is across the lower back to the hips and intermittent to the left posterior thigh. Last month she had an intense pain that limited her walking. Pain took about 2 days to dissipate.   08/08/2022: follow up today after left SIJ injection on 7/18/22.   1/24/22: follow up today after left L4/5 L5/S1 TFESI on 1/4/22. Pain in the left lower back with radiation into the left buttock. pain over the left SIJ.   12/6/21: follow up today. Pain is returning will schedule repeat injection.  7/28/21- patient had 60% relief from pain in leg. Still has some upper thigh pain. Will repeat injection. ESR was normal. very tender over the left piriformis muscle.   6/23/21: follow up today to review MRI. This was essentially unchanged from previous. Pain 5/10 today. Pain is currently in the left lower back with radiation to the left thigh. do not have the results of the blood work.   MRI 6/15/21: At L5-S1: There is disc bulge and facet arthropathy. There is bilateral subarticular recess stenosis with impingement of descending S1 nerve roots. There is left greater than right neuroforaminal stenosis with impingement of exiting left L5 nerve root. Findings are unchanged from previous study. At L4-5: There is disc bulge and facet arthropathy. There is mild to moderate spinal canal stenosis. There is left greater than right subarticular recess stenosis with suspected impingement descending L5 nerve roots. There is moderate left and mild right neuroforaminal stenosis. Findings are not significant changed from previous examination. At L3-4: There is disc bulge and right foraminal disc protrusion. There is mild to moderate spinal canal stenosis. There is moderate right and mild left neuroforaminal stenosis mild impingement of exiting nerve root on the right. Findings are not significant changed from previous examination. At L2-3: There is disc bulge with mild spinal canal bilateral neural foraminal stenosis. Findings are not significant changed from previous examination. At L1-2: There is disc bulge and shallow right central disc protrusion without significant spinal canal stenosis. There is mild right subarticular recess stenosis and mild right neuroforaminal stenosis unchanged from previous examination.  6/14/21- Patient here for f/u. patient states about 1 month ago she was laying flat in bed and could not lift left leg. She has to lift her leg with her hands. Also complains of pain is in front of thigh to the knee. Has slight numbness. Will get new MRI as it is over 3 years old.  2/23/21- Patient complains of b/l low back pain worse on left. Would like to call for an injection as she will be getting her second covid vaccine next week [] : Post Surgical Visit: no [FreeTextEntry7] : Left buttock, left groin, hip

## 2024-06-13 NOTE — PHYSICAL EXAM
[TWNoteComboBox7] : forward flexion 60 degrees [de-identified] : extension 20 degrees [] : no palpable mass [5___] : adduction 5[unfilled]/5 [Left] : left hip [Mild arthritis (Tonnis Grade 1)] : Mild arthritis (Tonnis Grade 1) [de-identified] : external rotation 30 degrees [TWNoteComboBox6] : internal rotation 20 degrees

## 2024-06-14 ENCOUNTER — RESULT REVIEW (OUTPATIENT)
Age: 74
End: 2024-06-14

## 2024-07-17 ENCOUNTER — APPOINTMENT (OUTPATIENT)
Dept: PAIN MANAGEMENT | Facility: CLINIC | Age: 74
End: 2024-07-17
Payer: MEDICARE

## 2024-07-17 DIAGNOSIS — M54.16 RADICULOPATHY, LUMBAR REGION: ICD-10-CM

## 2024-07-17 PROCEDURE — 64483 NJX AA&/STRD TFRM EPI L/S 1: CPT | Mod: LT

## 2024-07-17 PROCEDURE — 64484 NJX AA&/STRD TFRM EPI L/S EA: CPT | Mod: 59,LT

## 2024-07-29 ENCOUNTER — APPOINTMENT (OUTPATIENT)
Dept: PAIN MANAGEMENT | Facility: CLINIC | Age: 74
End: 2024-07-29
Payer: MEDICARE

## 2024-07-29 VITALS — WEIGHT: 173 LBS | BODY MASS INDEX: 28.82 KG/M2 | HEIGHT: 65 IN

## 2024-07-29 DIAGNOSIS — M54.16 RADICULOPATHY, LUMBAR REGION: ICD-10-CM

## 2024-07-29 PROCEDURE — 99213 OFFICE O/P EST LOW 20 MIN: CPT

## 2024-07-29 NOTE — HISTORY OF PRESENT ILLNESS
[Mid-back] : mid-back [Lower back] : lower back [Buttock] : buttock [Left Leg] : left leg [Right Leg] : right leg [2] : 2 [Dull/Aching] : dull/aching [Radiating] : radiating [Sharp] : sharp [Intermittent] : intermittent [Meds] : meds [Injection therapy] : injection therapy [Sitting] : sitting [Standing] : standing [Walking] : walking [Bending forward] : bending forward [Stairs] : stairs [Lying in bed] : lying in bed [Retired] : Work status: retired [3] : 3 [FreeTextEntry1] : 06/13/2024: follow up today for left L3/4, L4/5 TFESI on 7/17 with 80% relief.  Has relief from back pain.  Still has left hip pain.    9/28/23- fu for left piriformis injection on 8/30 75% relief.  Has low back pain, will give TPI  07/27/2023: follow up today for Left L5-S1, S1 TFESI on 6/27,  Had 50% relief.  Pain still in left buttocks  06/15/2023: follow up today. Pain is across the lower back to the hips and intermittent to the left posterior thigh. Last month she had an intense pain that limited her walking. Pain took about 2 days to dissipate.   08/08/2022: follow up today after left SIJ injection on 7/18/22.   1/24/22: follow up today after left L4/5 L5/S1 TFESI on 1/4/22. Pain in the left lower back with radiation into the left buttock. pain over the left SIJ.   12/6/21: follow up today. Pain is returning will schedule repeat injection.  7/28/21- patient had 60% relief from pain in leg. Still has some upper thigh pain. Will repeat injection. ESR was normal. very tender over the left piriformis muscle.   6/23/21: follow up today to review MRI. This was essentially unchanged from previous. Pain 5/10 today. Pain is currently in the left lower back with radiation to the left thigh. do not have the results of the blood work.   MRI 6/15/21: At L5-S1: There is disc bulge and facet arthropathy. There is bilateral subarticular recess stenosis with impingement of descending S1 nerve roots. There is left greater than right neuroforaminal stenosis with impingement of exiting left L5 nerve root. Findings are unchanged from previous study. At L4-5: There is disc bulge and facet arthropathy. There is mild to moderate spinal canal stenosis. There is left greater than right subarticular recess stenosis with suspected impingement descending L5 nerve roots. There is moderate left and mild right neuroforaminal stenosis. Findings are not significant changed from previous examination. At L3-4: There is disc bulge and right foraminal disc protrusion. There is mild to moderate spinal canal stenosis. There is moderate right and mild left neuroforaminal stenosis mild impingement of exiting nerve root on the right. Findings are not significant changed from previous examination. At L2-3: There is disc bulge with mild spinal canal bilateral neural foraminal stenosis. Findings are not significant changed from previous examination. At L1-2: There is disc bulge and shallow right central disc protrusion without significant spinal canal stenosis. There is mild right subarticular recess stenosis and mild right neuroforaminal stenosis unchanged from previous examination.  6/14/21- Patient here for f/u. patient states about 1 month ago she was laying flat in bed and could not lift left leg. She has to lift her leg with her hands. Also complains of pain is in front of thigh to the knee. Has slight numbness. Will get new MRI as it is over 3 years old.  2/23/21- Patient complains of b/l low back pain worse on left. Would like to call for an injection as she will be getting her second covid vaccine next week [] : Post Surgical Visit: no [FreeTextEntry7] : Left buttock, hip

## 2024-07-29 NOTE — PHYSICAL EXAM
[5___] : adduction 5[unfilled]/5 [Left] : left hip [Mild arthritis (Tonnis Grade 1)] : Mild arthritis (Tonnis Grade 1) [TWNoteComboBox7] : forward flexion 60 degrees [de-identified] : extension 20 degrees [] : no palpable mass [de-identified] : external rotation 30 degrees [TWNoteComboBox6] : internal rotation 20 degrees

## 2024-07-29 NOTE — PROCEDURE
[Large Joint Injection] : Large joint injection [Left] : of the left [Greater Trochanteric Bursa] : greater trochanteric bursa [X-ray evidence of Osteoarthritis on this or prior visit] : x-ray evidence of Osteoarthritis on this or prior visit [Alcohol] : alcohol [Betadine] : betadine [___ cc    0.25%] : Bupivacaine (Marcaine) ~Vcc of 0.25%  [___ cc    40mg] : Triamcinolone (Kenalog) ~Vcc of 40 mg  [] : Patient tolerated procedure well [Call if redness, pain or fever occur] : call if redness, pain or fever occur [Apply ice for 15min out of every hour for the next 12-24 hours as tolerated] : apply ice for 15 minutes out of every hour for the next 12-24 hours as tolerated

## 2024-07-29 NOTE — PHYSICAL EXAM
[5___] : adduction 5[unfilled]/5 [Left] : left hip [Mild arthritis (Tonnis Grade 1)] : Mild arthritis (Tonnis Grade 1) [TWNoteComboBox7] : forward flexion 60 degrees [de-identified] : extension 20 degrees [] : no palpable mass [de-identified] : external rotation 30 degrees [TWNoteComboBox6] : internal rotation 20 degrees

## 2024-09-05 ENCOUNTER — APPOINTMENT (OUTPATIENT)
Dept: PAIN MANAGEMENT | Facility: CLINIC | Age: 74
End: 2024-09-05

## 2025-05-10 NOTE — ASSESSMENT
[FreeTextEntry1] : After discussing various treatment options with the patient including but not limited to oral medications, physical therapy, exercise, modalities as well as interventional spinal injections, we have decided with the following plan:  1) Intervention Injection Therapy: I personally reviewed the MRI/CT scan images and agree with the radiologist's report. The radiological findings were discussed with the patient. The risks, benefits, contents and alternatives to injection were explained in full to the patient. Risks outlined include but are not limited to infection,sepsis, bleeding, post-dural puncture headache, nerve damage, temporary increase in pain, syncopal episode, failure to resolve symptoms, allergic reaction, symptom recurrence, and elevation of blood sugar in diabetics. Cortisone may cause immunosuppression. Patient understands the risks. All questions were answered. After discussion of options, patient requested an injection. Information regarding the injection was given to the patient. Which medications to stop prior to the injection was explained to the patient as well.  Follow up in 1-2 weeks post injection for re-evaluation.  Continue Home exercises, stretching, activity modification, physical therapy, and conservative care.  Patient is presenting with acute/sub-acute radicular pain with impairment in ADLs and functionality.  The pain has not responded sufficiently to  conservative care including nsaid therapy and/or physical therapy.  There is no bleeding tendency, unstable medical condition, or systemic infection. The purpose of the spinal injections is to facilitate active therapy by providing short term relief through reduction of pain and inflammation.   Injections, by themselves, are not likely to provide long-term relief. Rather, active rehabilitation with modified work achieves long-term relief by increasing active ROM, strength and stability.   left L3/4 L4/5 TFESI  General

## 2025-06-16 ENCOUNTER — APPOINTMENT (OUTPATIENT)
Dept: PAIN MANAGEMENT | Facility: CLINIC | Age: 75
End: 2025-06-16
Payer: MEDICARE

## 2025-06-16 VITALS — BODY MASS INDEX: 23.82 KG/M2 | WEIGHT: 143 LBS | HEIGHT: 65 IN

## 2025-06-16 PROCEDURE — 99214 OFFICE O/P EST MOD 30 MIN: CPT

## 2025-07-09 ENCOUNTER — APPOINTMENT (OUTPATIENT)
Dept: PAIN MANAGEMENT | Facility: CLINIC | Age: 75
End: 2025-07-09
Payer: MEDICARE

## 2025-07-09 PROCEDURE — 62323 NJX INTERLAMINAR LMBR/SAC: CPT

## 2025-07-21 ENCOUNTER — APPOINTMENT (OUTPATIENT)
Dept: PAIN MANAGEMENT | Facility: CLINIC | Age: 75
End: 2025-07-21
Payer: MEDICARE

## 2025-07-21 VITALS — WEIGHT: 143 LBS | HEIGHT: 65 IN | BODY MASS INDEX: 23.82 KG/M2

## 2025-07-21 DIAGNOSIS — M54.16 RADICULOPATHY, LUMBAR REGION: ICD-10-CM

## 2025-07-21 PROCEDURE — 99214 OFFICE O/P EST MOD 30 MIN: CPT | Mod: 25

## 2025-07-21 PROCEDURE — J3490M: CUSTOM | Mod: NC

## 2025-07-21 PROCEDURE — 20552 NJX 1/MLT TRIGGER POINT 1/2: CPT
